# Patient Record
Sex: FEMALE | Race: BLACK OR AFRICAN AMERICAN | NOT HISPANIC OR LATINO | Employment: OTHER | URBAN - METROPOLITAN AREA
[De-identification: names, ages, dates, MRNs, and addresses within clinical notes are randomized per-mention and may not be internally consistent; named-entity substitution may affect disease eponyms.]

---

## 2017-05-05 ENCOUNTER — HOSPITAL ENCOUNTER (EMERGENCY)
Facility: HOSPITAL | Age: 82
Discharge: HOME | End: 2017-05-05
Attending: EMERGENCY MEDICINE
Payer: COMMERCIAL

## 2017-05-05 VITALS
HEART RATE: 68 BPM | TEMPERATURE: 98 F | DIASTOLIC BLOOD PRESSURE: 69 MMHG | BODY MASS INDEX: 25.61 KG/M2 | OXYGEN SATURATION: 96 % | SYSTOLIC BLOOD PRESSURE: 160 MMHG | RESPIRATION RATE: 18 BRPM | WEIGHT: 150 LBS | HEIGHT: 64 IN

## 2017-05-05 DIAGNOSIS — I10 ESSENTIAL HYPERTENSION: Primary | ICD-10-CM

## 2017-05-05 LAB
ALBUMIN SERPL BCP-MCNC: 4 G/DL
ALP SERPL-CCNC: 43 U/L
ALT SERPL W/O P-5'-P-CCNC: 14 U/L
ANION GAP SERPL CALC-SCNC: 13 MMOL/L
AST SERPL-CCNC: 17 U/L
BASOPHILS # BLD AUTO: 0.02 K/UL
BASOPHILS NFR BLD: 0.4 %
BILIRUB SERPL-MCNC: 0.9 MG/DL
BILIRUB UR QL STRIP: NEGATIVE
BUN SERPL-MCNC: 18 MG/DL
CALCIUM SERPL-MCNC: 10.6 MG/DL
CHLORIDE SERPL-SCNC: 106 MMOL/L
CLARITY UR: CLEAR
CO2 SERPL-SCNC: 23 MMOL/L
COLOR UR: YELLOW
CREAT SERPL-MCNC: 1.8 MG/DL
DIFFERENTIAL METHOD: NORMAL
EOSINOPHIL # BLD AUTO: 0.2 K/UL
EOSINOPHIL NFR BLD: 2.8 %
ERYTHROCYTE [DISTWIDTH] IN BLOOD BY AUTOMATED COUNT: 14.3 %
EST. GFR  (AFRICAN AMERICAN): 30 ML/MIN/1.73 M^2
EST. GFR  (NON AFRICAN AMERICAN): 26 ML/MIN/1.73 M^2
GLUCOSE SERPL-MCNC: 85 MG/DL
GLUCOSE UR QL STRIP: NEGATIVE
HCT VFR BLD AUTO: 41.5 %
HGB BLD-MCNC: 13.6 G/DL
HGB UR QL STRIP: NEGATIVE
KETONES UR QL STRIP: NEGATIVE
LEUKOCYTE ESTERASE UR QL STRIP: NEGATIVE
LYMPHOCYTES # BLD AUTO: 1.3 K/UL
LYMPHOCYTES NFR BLD: 24.1 %
MCH RBC QN AUTO: 29.1 PG
MCHC RBC AUTO-ENTMCNC: 32.8 %
MCV RBC AUTO: 89 FL
MONOCYTES # BLD AUTO: 0.5 K/UL
MONOCYTES NFR BLD: 8.5 %
NEUTROPHILS # BLD AUTO: 3.5 K/UL
NEUTROPHILS NFR BLD: 64.2 %
NITRITE UR QL STRIP: NEGATIVE
PH UR STRIP: 7 [PH] (ref 5–8)
PLATELET # BLD AUTO: 203 K/UL
PMV BLD AUTO: 9.7 FL
POTASSIUM SERPL-SCNC: 4.3 MMOL/L
PROT SERPL-MCNC: 8.1 G/DL
PROT UR QL STRIP: NEGATIVE
RBC # BLD AUTO: 4.68 M/UL
SODIUM SERPL-SCNC: 142 MMOL/L
SP GR UR STRIP: <=1.005 (ref 1–1.03)
URN SPEC COLLECT METH UR: ABNORMAL
UROBILINOGEN UR STRIP-ACNC: NEGATIVE EU/DL
WBC # BLD AUTO: 5.39 K/UL

## 2017-05-05 PROCEDURE — 85025 COMPLETE CBC W/AUTO DIFF WBC: CPT

## 2017-05-05 PROCEDURE — 81003 URINALYSIS AUTO W/O SCOPE: CPT

## 2017-05-05 PROCEDURE — 80053 COMPREHEN METABOLIC PANEL: CPT

## 2017-05-05 PROCEDURE — 99283 EMERGENCY DEPT VISIT LOW MDM: CPT

## 2017-05-05 RX ORDER — NAPROXEN SODIUM 220 MG/1
81 TABLET, FILM COATED ORAL DAILY
COMMUNITY

## 2017-05-05 RX ORDER — ATORVASTATIN CALCIUM 10 MG/1
10 TABLET, FILM COATED ORAL DAILY
COMMUNITY

## 2017-05-05 NOTE — ED AVS SNAPSHOT
[facility]  [address] [facility] [address]               Efe Melendez   2017  5:44 PM   ED    Description:  Female : 1933   Department:  [facility]            Your Care was Coordinated By:     Provider Role From To    Marcial Peter MD Attending Provider 17 0344 --      Reason for Visit     Hypertension     Dysuria           Diagnoses this Visit        Comments    Essential hypertension    -  Primary       ED Disposition     ED Disposition Condition Comment    Discharge             To Do List           Follow-up Information     Follow up with Galdino Smith MD In 3 days.    Specialty:  Family Medicine    Contact information:    [address]  [facility] [address]  [phone]        [facility]     [facility] -  Assistance  Unless otherwise directed by your provider, please contact [facility], our nurse care line that is available for  assistance.     Registered nurses in the [facility] provide: appointment scheduling, clinical advisement, health education, and other advisory services.  Call: [phone] (toll free)               Medications           Message regarding Medications     Verify the changes and/or additions to your medication regime listed below are the same as discussed with your clinician today.  If any of these changes or additions are incorrect, please notify your healthcare provider.             Verify that the below list of medications is an accurate representation of the medications you are currently taking.  If none reported, the list may be blank. If incorrect, please contact your healthcare provider. Carry this list with you in case of emergency.           Current Medications     aspirin 81 MG Chew Take 81 mg by mouth once daily.    atenolol (TENORMIN) 50 MG tablet Take 50 mg by mouth once daily.    atorvastatin (LIPITOR) 10 MG  "tablet Take 10 mg by mouth once daily.    levothyroxine (SYNTHROID) 50 MCG tablet Take 50 mcg by mouth once daily.    potassium chloride (KLOR-CON) 10 MEQ TbSR Take 20 mEq by mouth once daily.    triamterene-hydrochlorothiazide 37.5-25 mg (DYAZIDE) 37.5-25 mg per capsule Take 1 capsule by mouth every morning.    docusate sodium (COLACE) 100 MG capsule Take 1 capsule (100 mg total) by mouth 3 (three) times daily as needed.    hydrocodone-acetaminophen 10-325mg (NORCO)  mg Tab Take by mouth.    meloxicam (MOBIC) 15 MG tablet Take 15 mg by mouth once daily.    metformin (GLUCOPHAGE) 500 MG tablet Take 250 mg by mouth 2 (two) times daily with meals.           Clinical Reference Information           Your Vitals Were     BP Pulse Temp Resp Height Weight    158/71 (BP Location: Right arm, Patient Position: Lying, BP Method: Automatic) 61 98.3 °F (36.8 °C) (Oral) 17 5' 4" (1.626 m) 68 kg (150 lb)    SpO2 BMI             95% 25.75 kg/m2         Allergies as of 5/5/2017     No Known Allergies      Immunizations Administered on Date of Encounter - 5/5/2017     None      ED Micro, Lab, POCT     Start Ordered       Status Ordering Provider    05/05/17 1955 05/05/17 1954  Comprehensive metabolic panel  STAT      Final result     05/05/17 1817 05/05/17 1816  CBC auto differential  STAT      Final result     05/05/17 1817 05/05/17 1816    STAT,   Status:  Canceled      Canceled     05/05/17 1817 05/05/17 1816    STAT,   Status:  Canceled      Canceled     05/05/17 1742 05/05/17 1742  Urinalysis  STAT      Final result       ED Imaging Orders     None        Discharge Instructions         Eating Heart-Healthy Foods  Eating has a big impact on your heart health. In fact, eating healthier can improve several of your heart risks at once. For instance, it helps you manage weight, cholesterol, and blood pressure. Here are ideas to help you make heart-healthy changes without giving up all the foods and flavors you love.  Getting " started  · Talk with your health care provider about eating plans, such as the DASH or Mediterranean diet. You may also be referred to a dietitian.  · Change a few things at a time. Give yourself time to get used to a few eating changes before adding more.  · Work to create a tasty, healthy eating plan that you can stick to for the rest of your life.    Goals for healthy eating  Below are some tips to improve your eating habits:  · Limit saturated fats and trans fats. Saturated fats raise your levels of cholesterol, so keep these fats to a minimum. They are found in foods such as fatty meats, whole milk, cheese, and palm and coconut oils. Avoid trans fats because they lower good cholesterol as well as raise bad cholesterol. Trans fats are most often found in processed foods.  · Reduce sodium (salt) intake. Eating too much salt may increase your blood pressure. Limit your sodium intake to 2,300 milligrams (mg) per day, or less if your health care provider recommends it. Dining out less often and eating fewer processed foods are two great ways to decrease the amount of salt you consume.  · Managing calories. A calorie is a unit of energy. Your body burns calories for fuel, but if you eat more calories than your body burns, the extras are stored as fat. Your health care provider can help you create a diet plan to manage your calories. This will likely include eating healthier foods as well as exercising regularly. To help you track your progress, keep a diary to record what you eat and how often you exercise.  Choose the right foods  Aim to make these foods staples of your diet. If you have diabetes, you may have different recommendations than what is listed here:  · Fruits and vegetable provide plenty of nutrients without a lot of calories. At meals, fill half your plate with these foods. Split the other half of your plate between whole grains and lean protein.  · Whole grains are high in fiber and rich in vitamins  and nutrients. Good choices include whole-wheat bread, pasta, and brown rice.  · Lean proteins give you nutrition with less fat. Good choices include fish, skinless chicken, and beans.  · Low-fat or nonfat dairy provides nutrients without a lot of fat. Try low-fat or nonfat milk, cheese, or yogurt.  · Healthy fats can be good for you in small amounts. These are unsaturated fats, such as olive oil, nuts, and fish. Try to have at least 2 servings per week of fatty fish such as salmon, sardines, mackerel, rainbow trout, and albacore tuna. These contain omega-3 fatty acids, which are good for your heart. Flaxseed is another source of a heart-healthy fat.  More on heart healthy eating    Read food labels  Healthy eating starts at the grocery store. Be sure to pay attention to food labels on packaged foods. Look for products that are high in fiber and protein, and low in saturated fat, cholesterol, and sodium. Avoid products that contain trans fat. And pay close attention to serving size. For instance, if you plan to eat two servings, double all the numbers on the label.  Prepare food right  A key part of healthy cooking is cutting down on added fat and salt. Look on the internet for lower-fat, lower-sodium recipes. Also, try these tips:  · Remove fat from meat and skin from poultry before cooking.  · Skim fat from the surface of soups and sauces.  · Broil, boil, bake, steam, grill, and microwave food without added fats.  · Choose ingredients that spice up your food without adding calories, fat, or sodium. Try these items: horseradish, hot sauce, lemon, mustard, nonfat salad dressings, and vinegar. For salt-free herbs and spices, try basil, cilantro, cinnamon, pepper, and rosemary.  Date Last Reviewed: 6/25/2015  © 4380-5115 [employer]. [address] [place] [address], [place] [address]. All rights reserved. This information is not intended as a substitute for professional medical care. Always follow your healthcare  professional's instructions.          [facility] Sign-Up     Activating your [facility] account is as easy as 1-2-3!     1) Visit [email or web address], select Sign Up Now, enter this activation code and your date of birth, then select Next.  [email or web address]  Expires: 6/19/2017  8:58 PM      2) Create a username and password to use when you visit [facility] in the future and select a security question in case you lose your password and select Next.    3) Enter your e-mail address and click Sign Up!    Additional Information  If you have questions, please e-mail [email or web address] or call [phone] to talk to our [facility] staff. Remember, [facility] is NOT to be used for urgent needs. For medical emergencies, dial 911.         Smoking Cessation     If you would like to quit smoking:   You may be eligible for free services if you are a [place] resident and started smoking cigarettes before September 1, 1988.  Call the Smoking Cessation [employer] (SCT) toll free at (739) 985-9255 or (753) 566-9655.   Call 2-951-QUIT-NOW if you do not meet the above criteria.   Contact us via email: [email or web address]   View our website for more information: [email or web address]         [facility] complies with applicable Federal civil rights laws and does not discriminate on the basis of race, color, national origin, age, disability, or sex.        Language Assistance Services     ATTENTION: Language assistance services are available, free of charge. Please call [phone].      ATENCIÓN: Si habla español, tiene a may disposición servicios gratuitos de asistencia lingüística. Llame al [phone].     CHÚ Ý: N?u b?n nói Ti?ng Vi?t, có các d?ch v? h? tr? ngôn ng? mi?n phí dành cho b?n. G?i s? [phone].

## 2017-05-05 NOTE — ED PROVIDER NOTES
SCRIBE #1 NOTE: I, Alfredo Wallace, am scribing for, and in the presence of, Marcial Peter MD. I have scribed the entire note.      History      Chief Complaint   Patient presents with    Hypertension     pt reports her pressure was going up at home, denies headache    Dysuria     pt reports urinary frequcny       Review of patient's allergies indicates:  No Known Allergies     HPI   HPI    5/5/2017, 6:18 PM   History obtained from the patient      History of Present Illness: Efe Melendez is a 83 y.o. female patient who presents to the Emergency Department for increase in urine frequency which onset gradually 8 hours PTA. Symptoms are intermittent and moderate in severity. Pt reports her blood pressure was high at home. No mitigating or exacerbating factors reported. No associated sx reported. Patient denies any dysuria, hematuria, CP, SOB, n/v/d, HA, lightheadedness, dizziness, numbness, weakness, abdominal pain, and all other sxs at this time. No further complaints or concerns at this time.         Arrival mode: Personal vehicle     PCP: Galdino Smith MD       Past Medical History:  Past Medical History:   Diagnosis Date    Diabetes mellitus     Hypertension     Thyroid disease        Past Surgical History:  Past Surgical History:   Procedure Laterality Date    BREAST SURGERY      CARPAL TUNNEL RELEASE      HYSTERECTOMY           Family History:  History reviewed. No pertinent family history.    Social History:  Social History     Social History Main Topics    Smoking status: Former Smoker    Smokeless tobacco: Unknown    Alcohol use No    Drug use: No    Sexual activity: Unknown       ROS   Review of Systems   Constitutional: Negative for chills and fever.   HENT: Negative for sore throat.    Respiratory: Negative for cough and shortness of breath.    Cardiovascular: Negative for chest pain.   Gastrointestinal: Negative for abdominal pain, constipation, diarrhea, nausea and vomiting.  "  Genitourinary: Positive for frequency (increase). Negative for dysuria.   Musculoskeletal: Negative for back pain.   Skin: Negative for rash.   Neurological: Negative for dizziness, weakness, light-headedness, numbness and headaches.   Hematological: Does not bruise/bleed easily.       Physical Exam    Initial Vitals   BP Pulse Resp Temp SpO2   05/05/17 1739 05/05/17 1739 05/05/17 1739 05/05/17 1739 --   189/100 81 18 98.3 °F (36.8 °C)       Physical Exam  Nursing Notes and Vital Signs Reviewed.  Constitutional: Patient is in no acute distress. Awake and alert. Elderly.  Head: Atraumatic. Normocephalic.  Eyes: PERRL. EOM intact. Conjunctivae are not pale. No scleral icterus.  ENT: Mucous membranes are moist. Oropharynx is clear and symmetric.    Neck: Supple. Full ROM. No lymphadenopathy.  Cardiovascular: Regular rate. Regular rhythm. No murmurs, rubs, or gallops. Distal pulses are 2+ and symmetric.  Pulmonary/Chest: No respiratory distress. Clear to auscultation bilaterally. No wheezing, rales, or rhonchi.  Abdominal: Soft and non-distended.  There is no tenderness.  No rebound, guarding, or rigidity.  Musculoskeletal: Moves all extremities. No obvious deformities. No edema.   Skin: Warm and dry.  Neurological:  Alert, awake, and appropriate.  Normal speech.  No acute focal neurological deficits are appreciated.  Psychiatric: Normal affect. Good eye contact. Appropriate in content.    ED Course    Procedures  ED Vital Signs:  Vitals:    05/05/17 1739 05/05/17 1823 05/05/17 2020 05/05/17 2100   BP: (!) 189/100 (!) 173/80 (!) 158/71 (!) 160/69   Pulse: 81 71 61 68   Resp: 18 17 17 18   Temp: 98.3 °F (36.8 °C)      TempSrc: Oral      SpO2:  97% 95% 96%   Weight: 68 kg (150 lb)      Height: 5' 4" (1.626 m)          Abnormal Lab Results:  Labs Reviewed   URINALYSIS - Abnormal; Notable for the following:        Result Value    Specific Gravity, UA <=1.005 (*)     All other components within normal limits "   COMPREHENSIVE METABOLIC PANEL - Abnormal; Notable for the following:     Creatinine 1.8 (*)     Calcium 10.6 (*)     Alkaline Phosphatase 43 (*)     eGFR if  30 (*)     eGFR if non  26 (*)     All other components within normal limits   CBC W/ AUTO DIFFERENTIAL        All Lab Results:  Results for orders placed or performed during the hospital encounter of 05/05/17   Urinalysis   Result Value Ref Range    Specimen UA Urine, Clean Catch     Color, UA Yellow Yellow, Straw, Jacki    Appearance, UA Clear Clear    pH, UA 7.0 5.0 - 8.0    Specific Gravity, UA <=1.005 (A) 1.005 - 1.030    Protein, UA Negative Negative    Glucose, UA Negative Negative    Ketones, UA Negative Negative    Bilirubin (UA) Negative Negative    Occult Blood UA Negative Negative    Nitrite, UA Negative Negative    Urobilinogen, UA Negative <2.0 EU/dL    Leukocytes, UA Negative Negative   CBC auto differential   Result Value Ref Range    WBC 5.39 3.90 - 12.70 K/uL    RBC 4.68 4.00 - 5.40 M/uL    Hemoglobin 13.6 12.0 - 16.0 g/dL    Hematocrit 41.5 37.0 - 48.5 %    MCV 89 82 - 98 fL    MCH 29.1 27.0 - 31.0 pg    MCHC 32.8 32.0 - 36.0 %    RDW 14.3 11.5 - 14.5 %    Platelets 203 150 - 350 K/uL    MPV 9.7 9.2 - 12.9 fL    Gran # 3.5 1.8 - 7.7 K/uL    Lymph # 1.3 1.0 - 4.8 K/uL    Mono # 0.5 0.3 - 1.0 K/uL    Eos # 0.2 0.0 - 0.5 K/uL    Baso # 0.02 0.00 - 0.20 K/uL    Gran% 64.2 38.0 - 73.0 %    Lymph% 24.1 18.0 - 48.0 %    Mono% 8.5 4.0 - 15.0 %    Eosinophil% 2.8 0.0 - 8.0 %    Basophil% 0.4 0.0 - 1.9 %    Differential Method Automated    Comprehensive metabolic panel   Result Value Ref Range    Sodium 142 136 - 145 mmol/L    Potassium 4.3 3.5 - 5.1 mmol/L    Chloride 106 95 - 110 mmol/L    CO2 23 23 - 29 mmol/L    Glucose 85 70 - 110 mg/dL    BUN, Bld 18 8 - 23 mg/dL    Creatinine 1.8 (H) 0.5 - 1.4 mg/dL    Calcium 10.6 (H) 8.7 - 10.5 mg/dL    Total Protein 8.1 6.0 - 8.4 g/dL    Albumin 4.0 3.5 - 5.2 g/dL    Total  Bilirubin 0.9 0.1 - 1.0 mg/dL    Alkaline Phosphatase 43 (L) 55 - 135 U/L    AST 17 10 - 40 U/L    ALT 14 10 - 44 U/L    Anion Gap 13 8 - 16 mmol/L    eGFR if African American 30 (A) >60 mL/min/1.73 m^2    eGFR if non African American 26 (A) >60 mL/min/1.73 m^2                The Emergency Provider reviewed the vital signs and test results, which are outlined above.    ED Discussion     8:51 PM: Reassessed pt at this time. Pt is awake, alert, and in no distress.  Pt states her condition has improved at this time. Discussed with pt all pertinent ED information and results. Discussed pt dx and plan of tx. Gave pt all f/u and return to the ED instructions. All questions and concerns were addressed at this time. Pt expresses understanding of information and instructions, and is comfortable with plan to discharge. Pt is stable for discharge.    Pre-hypertension/Hypertension: The pt has been informed that they may have pre-hypertension or hypertension based on a blood pressure reading in the ED. I recommend that the pt call the PCP listed on their discharge instructions or a physician of their choice this week to arrange f/u for further evaluation of possible pre-hypertension or hypertension.       ED Medication(s):  Medications - No data to display    Discharge Medication List as of 5/5/2017  8:58 PM          Follow-up Information     Follow up with Galdino Smith MD In 3 days.    Specialty:  Family Medicine    Contact information:    [address] [facility] [id number] [phone]              Medical Decision Making    Medical Decision Making:   Clinical Tests:   Lab Tests: Ordered and Reviewed           Scribe Attestation:   Scribe #1: I performed the above scribed service and the documentation accurately describes the services I performed. I attest to the accuracy of the note.    Attending:   Physician Attestation Statement for Scribe #1: I, Marcial Peter MD, personally performed the services  described in this documentation, as scribed by Alfredo Wallace, in my presence, and it is both accurate and complete.          Clinical Impression       ICD-10-CM ICD-9-CM   1. Essential hypertension I10 401.9       Disposition:   Disposition: Discharged  Condition: Stable         Marcial Peter MD  05/05/17 [id number]

## 2017-05-06 NOTE — ED NOTES
Received report from April, RN Pt. In NAD, VSS, RR equal and unlabored. Pt awaiting results and disposition. Pt's bed is low, locked, and call light in reach. SR up X2. Will continue to monitor pt.

## 2017-05-06 NOTE — DISCHARGE INSTRUCTIONS

## 2017-06-03 ENCOUNTER — HOSPITAL ENCOUNTER (EMERGENCY)
Facility: HOSPITAL | Age: 82
Discharge: HOME | End: 2017-06-03
Attending: EMERGENCY MEDICINE
Payer: COMMERCIAL

## 2017-06-03 VITALS
RESPIRATION RATE: 13 BRPM | DIASTOLIC BLOOD PRESSURE: 60 MMHG | BODY MASS INDEX: 28.17 KG/M2 | SYSTOLIC BLOOD PRESSURE: 141 MMHG | TEMPERATURE: 99 F | OXYGEN SATURATION: 99 % | HEIGHT: 64 IN | HEART RATE: 73 BPM | WEIGHT: 165 LBS

## 2017-06-03 DIAGNOSIS — N28.9 RENAL INSUFFICIENCY: ICD-10-CM

## 2017-06-03 DIAGNOSIS — I95.1 ORTHOSTATIC HYPOTENSION: Primary | ICD-10-CM

## 2017-06-03 DIAGNOSIS — R42 DIZZINESS: ICD-10-CM

## 2017-06-03 DIAGNOSIS — I10 ESSENTIAL HYPERTENSION: ICD-10-CM

## 2017-06-03 LAB
ALBUMIN SERPL BCP-MCNC: 3.6 G/DL
ALP SERPL-CCNC: 38 U/L
ALT SERPL W/O P-5'-P-CCNC: 17 U/L
ANION GAP SERPL CALC-SCNC: 13 MMOL/L
APTT BLDCRRT: 25.9 SEC
AST SERPL-CCNC: 15 U/L
BACTERIA #/AREA URNS HPF: ABNORMAL /HPF
BASOPHILS # BLD AUTO: 0.02 K/UL
BASOPHILS NFR BLD: 0.4 %
BILIRUB SERPL-MCNC: 0.9 MG/DL
BILIRUB UR QL STRIP: NEGATIVE
BUN SERPL-MCNC: 18 MG/DL
CALCIUM SERPL-MCNC: 10 MG/DL
CHLORIDE SERPL-SCNC: 107 MMOL/L
CLARITY UR: CLEAR
CO2 SERPL-SCNC: 22 MMOL/L
COLOR UR: YELLOW
CREAT SERPL-MCNC: 1.8 MG/DL
DIFFERENTIAL METHOD: NORMAL
EOSINOPHIL # BLD AUTO: 0.1 K/UL
EOSINOPHIL NFR BLD: 2.4 %
ERYTHROCYTE [DISTWIDTH] IN BLOOD BY AUTOMATED COUNT: 14 %
EST. GFR  (AFRICAN AMERICAN): 30 ML/MIN/1.73 M^2
EST. GFR  (NON AFRICAN AMERICAN): 26 ML/MIN/1.73 M^2
GLUCOSE SERPL-MCNC: 100 MG/DL
GLUCOSE UR QL STRIP: NEGATIVE
HCT VFR BLD AUTO: 38.7 %
HGB BLD-MCNC: 12.8 G/DL
HGB UR QL STRIP: ABNORMAL
INR PPP: 1
KETONES UR QL STRIP: NEGATIVE
LEUKOCYTE ESTERASE UR QL STRIP: ABNORMAL
LYMPHOCYTES # BLD AUTO: 1.2 K/UL
LYMPHOCYTES NFR BLD: 26.1 %
MCH RBC QN AUTO: 29 PG
MCHC RBC AUTO-ENTMCNC: 33.1 %
MCV RBC AUTO: 88 FL
MICROSCOPIC COMMENT: ABNORMAL
MONOCYTES # BLD AUTO: 0.5 K/UL
MONOCYTES NFR BLD: 10.3 %
NEUTROPHILS # BLD AUTO: 2.9 K/UL
NEUTROPHILS NFR BLD: 60.8 %
NITRITE UR QL STRIP: NEGATIVE
PH UR STRIP: 6 [PH] (ref 5–8)
PLATELET # BLD AUTO: 204 K/UL
PMV BLD AUTO: 9.2 FL
POTASSIUM SERPL-SCNC: 3.8 MMOL/L
PROT SERPL-MCNC: 7.3 G/DL
PROT UR QL STRIP: NEGATIVE
PROTHROMBIN TIME: 10.7 SEC
RBC # BLD AUTO: 4.41 M/UL
RBC #/AREA URNS HPF: 1 /HPF (ref 0–4)
SODIUM SERPL-SCNC: 142 MMOL/L
SP GR UR STRIP: 1.02 (ref 1–1.03)
SQUAMOUS #/AREA URNS HPF: 50 /HPF
TROPONIN I SERPL DL<=0.01 NG/ML-MCNC: 0.02 NG/ML
URN SPEC COLLECT METH UR: ABNORMAL
UROBILINOGEN UR STRIP-ACNC: NEGATIVE EU/DL
WBC # BLD AUTO: 4.68 K/UL
WBC #/AREA URNS HPF: 10 /HPF (ref 0–5)

## 2017-06-03 PROCEDURE — 84484 ASSAY OF TROPONIN QUANT: CPT

## 2017-06-03 PROCEDURE — 99284 EMERGENCY DEPT VISIT MOD MDM: CPT | Mod: 25

## 2017-06-03 PROCEDURE — 85025 COMPLETE CBC W/AUTO DIFF WBC: CPT

## 2017-06-03 PROCEDURE — 85610 PROTHROMBIN TIME: CPT

## 2017-06-03 PROCEDURE — 93010 ELECTROCARDIOGRAM REPORT: CPT | Mod: S$GLB,,, | Performed by: INTERNAL MEDICINE

## 2017-06-03 PROCEDURE — 96360 HYDRATION IV INFUSION INIT: CPT

## 2017-06-03 PROCEDURE — 80053 COMPREHEN METABOLIC PANEL: CPT

## 2017-06-03 PROCEDURE — 25000003 PHARM REV CODE 250: Performed by: EMERGENCY MEDICINE

## 2017-06-03 PROCEDURE — 85730 THROMBOPLASTIN TIME PARTIAL: CPT

## 2017-06-03 PROCEDURE — 93005 ELECTROCARDIOGRAM TRACING: CPT

## 2017-06-03 PROCEDURE — 81000 URINALYSIS NONAUTO W/SCOPE: CPT

## 2017-06-03 RX ORDER — FAMOTIDINE 10 MG/1
10 TABLET ORAL 2 TIMES DAILY
COMMUNITY

## 2017-06-03 RX ORDER — DOXAZOSIN 1 MG/1
1 TABLET ORAL NIGHTLY
COMMUNITY

## 2017-06-03 RX ADMIN — SODIUM CHLORIDE 500 ML: 0.9 INJECTION, SOLUTION INTRAVENOUS at 11:06

## 2017-06-03 NOTE — ED PROVIDER NOTES
SCRIBE #1 NOTE: I, Caren Parks, am scribing for, and in the presence of, Sommer Matson DO. I have scribed the entire note.      History      Chief Complaint   Patient presents with    Dizziness     pt states she feels dizzy and weak since last night       Review of patient's allergies indicates:  No Known Allergies     HPI   HPI    6/3/2017, 9:59 AM   History obtained from the patient      History of Present Illness: Efe Melendez is a 83 y.o. female patient who presents to the Emergency Department for generalized weakness which onset gradually over the past few months. Symptoms are constant and moderate in severity.  No mitigating or exacerbating factors reported. Associated sxs include dizziness, malaise, and increased urination during the night. Patient denies any rashes, sores, localized numbness/weakness, CP, SOB, abd pain, LOC, and all other sxs at this time. No prior tx reported. No further complaints or concerns at this time.         Arrival mode: Personal vehicle     PCP: Galdino Smith MD       Past Medical History:  Past Medical History:   Diagnosis Date    Diabetes mellitus     Hypertension     Thyroid disease        Past Surgical History:  Past Surgical History:   Procedure Laterality Date    BREAST SURGERY      CARPAL TUNNEL RELEASE      HYSTERECTOMY           Family History:  No family history on file.    Social History:  Social History     Social History Main Topics    Smoking status: Former Smoker    Smokeless tobacco: Not on file    Alcohol use No    Drug use: No    Sexual activity: Not on file       ROS   Review of Systems   Constitutional: Negative for fever.        (+) Malaise   HENT: Negative for sore throat.    Respiratory: Negative for shortness of breath.    Cardiovascular: Negative for chest pain.   Gastrointestinal: Negative for nausea.   Genitourinary: Negative for dysuria.        (+) urine increased   Musculoskeletal: Negative for back pain.        (-)  "generalized weakness   Skin: Negative for rash.        (-) sores     Neurological: Positive for dizziness. Negative for weakness, light-headedness and headaches.   Hematological: Does not bruise/bleed easily.     Physical Exam      Initial Vitals [06/03/17 0929]   BP Pulse Resp Temp SpO2   (!) 163/77 95 16 98.9 °F (37.2 °C) 97 %      Physical Exam  Nursing Notes and Vital Signs Reviewed.  Constitutional: Patient is in no acute distress. Well-developed and well-nourished.  Head: Atraumatic. Normocephalic.  Eyes: PERRL. EOM intact. Conjunctivae are not pale. No scleral icterus.  ENT: Mucous membranes are moist. Oropharynx is clear and symmetric.    Neck: Supple. Full ROM. No lymphadenopathy.  Cardiovascular: Regular rate. Regular rhythm. No murmurs, rubs, or gallops. Distal pulses are 2+ and symmetric.  Pulmonary/Chest: No respiratory distress. Clear to auscultation bilaterally. No wheezing, rales, or rhonchi.  Abdominal: Soft and non-distended.  There is no tenderness.  No rebound, guarding, or rigidity. Good bowel sounds.  Musculoskeletal: Moves all extremities. No obvious deformities. No edema. No calf tenderness.  Skin: Warm and dry.  Neurological: Patient is alert and oriented to person, place and time. Pupils ERRL and EOM normal. Cranial nerves II-XII are intact. Strength is full bilaterally; it is equal and 5/5 in bilateral upper and lower extremities. There is no pronator drift of outstretched arms. Light touch sense is intact. Speech is clear and normal. No acute focal neurological deficits noted. NIH stroke scale is zero.  Psychiatric: Normal affect. Good eye contact. Appropriate in content.    ED Course    Procedures  ED Vital Signs:  Vitals:    06/03/17 0929 06/03/17 0936 06/03/17 1009 06/03/17 1011   BP: (!) 163/77 (!) 163/77 132/62 (!) 143/79   Pulse: 95 93 78 80   Resp: 16 14     Temp: 98.9 °F (37.2 °C)      TempSrc: Oral      SpO2: 97% 98% 99% 99%   Weight: 74.8 kg (165 lb)      Height: 5' 4" (1.626 m) "       06/03/17 1013 06/03/17 1050   BP: 139/73 (!) 141/60   Pulse: 102 73   Resp:  13   Temp:     TempSrc:     SpO2: 96% 99%   Weight:     Height:         Abnormal Lab Results:  Labs Reviewed   COMPREHENSIVE METABOLIC PANEL - Abnormal; Notable for the following:        Result Value    CO2 22 (*)     Creatinine 1.8 (*)     Alkaline Phosphatase 38 (*)     eGFR if  30 (*)     eGFR if non  26 (*)     All other components within normal limits   URINALYSIS - Abnormal; Notable for the following:     Occult Blood UA Trace (*)     Leukocytes, UA 1+ (*)     All other components within normal limits   URINALYSIS MICROSCOPIC - Abnormal; Notable for the following:     WBC, UA 10 (*)     Bacteria, UA Few (*)     All other components within normal limits   CBC W/ AUTO DIFFERENTIAL   TROPONIN I   PROTIME-INR   APTT        All Lab Results:  Results for orders placed or performed during the hospital encounter of 06/03/17   CBC auto differential   Result Value Ref Range    WBC 4.68 3.90 - 12.70 K/uL    RBC 4.41 4.00 - 5.40 M/uL    Hemoglobin 12.8 12.0 - 16.0 g/dL    Hematocrit 38.7 37.0 - 48.5 %    MCV 88 82 - 98 fL    MCH 29.0 27.0 - 31.0 pg    MCHC 33.1 32.0 - 36.0 %    RDW 14.0 11.5 - 14.5 %    Platelets 204 150 - 350 K/uL    MPV 9.2 9.2 - 12.9 fL    Gran # 2.9 1.8 - 7.7 K/uL    Lymph # 1.2 1.0 - 4.8 K/uL    Mono # 0.5 0.3 - 1.0 K/uL    Eos # 0.1 0.0 - 0.5 K/uL    Baso # 0.02 0.00 - 0.20 K/uL    Gran% 60.8 38.0 - 73.0 %    Lymph% 26.1 18.0 - 48.0 %    Mono% 10.3 4.0 - 15.0 %    Eosinophil% 2.4 0.0 - 8.0 %    Basophil% 0.4 0.0 - 1.9 %    Differential Method Automated    Comprehensive metabolic panel   Result Value Ref Range    Sodium 142 136 - 145 mmol/L    Potassium 3.8 3.5 - 5.1 mmol/L    Chloride 107 95 - 110 mmol/L    CO2 22 (L) 23 - 29 mmol/L    Glucose 100 70 - 110 mg/dL    BUN, Bld 18 8 - 23 mg/dL    Creatinine 1.8 (H) 0.5 - 1.4 mg/dL    Calcium 10.0 8.7 - 10.5 mg/dL    Total Protein 7.3 6.0 -  8.4 g/dL    Albumin 3.6 3.5 - 5.2 g/dL    Total Bilirubin 0.9 0.1 - 1.0 mg/dL    Alkaline Phosphatase 38 (L) 55 - 135 U/L    AST 15 10 - 40 U/L    ALT 17 10 - 44 U/L    Anion Gap 13 8 - 16 mmol/L    eGFR if African American 30 (A) >60 mL/min/1.73 m^2    eGFR if non African American 26 (A) >60 mL/min/1.73 m^2   Troponin I   Result Value Ref Range    Troponin I 0.018 0.000 - 0.026 ng/mL   Protime-INR   Result Value Ref Range    Prothrombin Time 10.7 9.0 - 12.5 sec    INR 1.0 0.8 - 1.2   APTT   Result Value Ref Range    aPTT 25.9 21.0 - 32.0 sec   Urinalysis   Result Value Ref Range    Specimen UA Urine, Clean Catch     Color, UA Yellow Yellow, Straw, Jacki    Appearance, UA Clear Clear    pH, UA 6.0 5.0 - 8.0    Specific Gravity, UA 1.020 1.005 - 1.030    Protein, UA Negative Negative    Glucose, UA Negative Negative    Ketones, UA Negative Negative    Bilirubin (UA) Negative Negative    Occult Blood UA Trace (A) Negative    Nitrite, UA Negative Negative    Urobilinogen, UA Negative <2.0 EU/dL    Leukocytes, UA 1+ (A) Negative   Urinalysis Microscopic   Result Value Ref Range    RBC, UA 1 0 - 4 /hpf    WBC, UA 10 (H) 0 - 5 /hpf    Bacteria, UA Few (A) None-Occ /hpf    Squam Epithel, UA 50 /hpf    Microscopic Comment SEE COMMENT        Results for ZACK PETERSON (MRN [id number]) as of 6/3/2017 12:05   Ref. Range 5/5/2017 19:54 6/3/2017 10:31   BUN, Bld Latest Ref Range: 8 - 23 mg/dL 18 18   Creatinine Latest Ref Range: 0.5 - 1.4 mg/dL 1.8 (H) 1.8 (H)     Imaging Results:  Imaging Results          X-Ray Chest AP Portable (Final result)  Result time 06/03/17 11:41:55    Final result by Yoan Lawson MD (06/03/17 11:41:55)                 Impression:     No acute cardiopulmonary disease.            Electronically signed by: YOAN LAWSON MD  Date:     06/03/17  Time:    11:41              Narrative:    Exam: Portable chest radiograph    Clinical History:   .  Dizziness    Comparison: None.    Findings:.     No  consolidation or pleural effusion or pneumothorax.  There is some mild scarring in the left upper lobe and right lung base. The cardiac silhouette is within normal limits. The bones are intact.                             CT Head Without Contrast (Final result)  Result time 06/03/17 11:07:46    Final result by Yoan Lawson MD (06/03/17 11:07:46)                 Impression:         No CT evidence of acute intracranial abnormality.        All CT scans at this facility use dose modulation, iterative reconstruction and/or weight based dosing when appropriate to reduce radiation dose to as low as reasonably achievable.      Electronically signed by: YOAN LAWSON MD  Date:     06/03/17  Time:    11:07              Narrative:    Exam: CT scan of the head without contrast    Clinical History:  . Dizziness and weakness which began last night.     Technique: Axial CT imaging was performed through the head without intravenous contrast.     Findings:    No hydrocephalus, midline shift, mass effect, or acute intracranial hemorrhage. There is moderate chronic small vessel ischemic change of the white matter. There are age-related involutional changes of the brain. The visualized paranasal sinuses and mastoid air cells are clear. The skull is intact.                             The EKG was ordered, reviewed, and independently interpreted by the ED provider.  Interpretation time: 9:32  Rate: 94 BPM  Rhythm: normal sinus rhythm  Interpretation:Right superior axis deviation. Possible anterior infarct, age undetermined.. No STEMI.             The Emergency Provider reviewed the vital signs and test results, which are outlined above.    ED Discussion     12:18 PM: Re-evaluated pt. Pt is resting comfortably and is in no acute distress.  D/w pt all pertinent results. Informed pt her urine was contaminated. Pt did not want to do cath specimen. Talked with pt about dehydration. D/w pt any concerns expressed at this time. Answered all  questions. Pt expresses understanding at this time.      12:32 PM: Reassessed pt at this time.  Pt is awake, alert, and in no apparent distress. Discussed with pt all pertinent ED information and results. Discussed pt dx and plan of tx. Gave pt all f/u and return to the ED instructions. All questions and concerns were addressed at this time. Pt expresses understanding of information and instructions, and is comfortable with plan to discharge. Pt is stable for discharge.      I discussed with patient and/or family/caretaker that evaluation in the ED does not suggest any emergent or life threatening medical conditions requiring immediate intervention beyond what was provided in the ED, and I believe patient is safe for discharge.  Regardless, an unremarkable evaluation in the ED does not preclude the development or presence of a serious of life threatening condition. As such, patient was instructed to return immediately for any worsening or change in current symptoms.        ED Medication(s):  Medications   sodium chloride 0.9% bolus 500 mL (500 mLs Intravenous New Bag 6/3/17 0217)       New Prescriptions    No medications on file       Follow-up Information     Galdino Smith MD. Schedule an appointment as soon as possible for a visit in 2 days.    Specialty:  Family Medicine  Why:  Return to the ED for:  dizziness, numbness/weakness to one side, slurred speech, fever, abdominal pain or other concerns.  Contact information:  [address] [facility] [address] [phone]                     Medical Decision Making    Medical Decision Making:   Clinical Tests:   Lab Tests: Ordered and Reviewed  Radiological Study: Ordered and Reviewed  Medical Tests: Ordered and Reviewed           Scribe Attestation:   Scribe #1: I performed the above scribed service and the documentation accurately describes the services I performed. I attest to the accuracy of the note.    Attending:   Physician Attestation  Statement for Scribe #1: I, Sommer Matson DO, personally performed the services described in this documentation, as scribed by Caren Parks, in my presence, and it is both accurate and complete.          Clinical Impression       ICD-10-CM ICD-9-CM   1. Orthostatic hypotension I95.1 458.0   2. Dizziness R42 780.4   3. Essential hypertension I10 401.9   4. Renal insufficiency N28.9 593.9       Disposition:   Disposition: Discharged  Condition: Stable         Sommer Matson DO  06/04/17 1357

## 2017-06-03 NOTE — ED NOTES
Pt reports she started feeling weak and dizzy last night. Pt was awake, lying down when she felt bad. Reports she has had same complaint since her last visit to ER in the beginning of May 2017. Pt has had no improvement since that visit. Went to Dr. Smith PCP and Dr. Benson, cardiology. Pt was prescribed Cardura and told to come back in 1 month. Pt was having trouble with hypertension.

## 2017-12-26 ENCOUNTER — HOSPITAL ENCOUNTER (EMERGENCY)
Facility: HOSPITAL | Age: 82
Discharge: HOME | End: 2017-12-26
Attending: EMERGENCY MEDICINE
Payer: COMMERCIAL

## 2017-12-26 VITALS
TEMPERATURE: 98 F | OXYGEN SATURATION: 96 % | HEIGHT: 64 IN | HEART RATE: 74 BPM | RESPIRATION RATE: 18 BRPM | DIASTOLIC BLOOD PRESSURE: 78 MMHG | BODY MASS INDEX: 27.31 KG/M2 | WEIGHT: 160 LBS | SYSTOLIC BLOOD PRESSURE: 174 MMHG

## 2017-12-26 DIAGNOSIS — I10 ESSENTIAL HYPERTENSION: ICD-10-CM

## 2017-12-26 DIAGNOSIS — R42 DIZZINESS: ICD-10-CM

## 2017-12-26 DIAGNOSIS — N28.9 RENAL INSUFFICIENCY: Primary | ICD-10-CM

## 2017-12-26 LAB
ALBUMIN SERPL BCP-MCNC: 3.4 G/DL
ALP SERPL-CCNC: 39 U/L
ALT SERPL W/O P-5'-P-CCNC: 11 U/L
ANION GAP SERPL CALC-SCNC: 10 MMOL/L
AST SERPL-CCNC: 15 U/L
BASOPHILS # BLD AUTO: 0.01 K/UL
BASOPHILS NFR BLD: 0.2 %
BILIRUB SERPL-MCNC: 0.9 MG/DL
BILIRUB UR QL STRIP: NEGATIVE
BUN SERPL-MCNC: 14 MG/DL
CALCIUM SERPL-MCNC: 9.6 MG/DL
CHLORIDE SERPL-SCNC: 109 MMOL/L
CLARITY UR: CLEAR
CO2 SERPL-SCNC: 22 MMOL/L
COLOR UR: YELLOW
CREAT SERPL-MCNC: 1.6 MG/DL
DIFFERENTIAL METHOD: NORMAL
EOSINOPHIL # BLD AUTO: 0.1 K/UL
EOSINOPHIL NFR BLD: 1.1 %
ERYTHROCYTE [DISTWIDTH] IN BLOOD BY AUTOMATED COUNT: 13.8 %
EST. GFR  (AFRICAN AMERICAN): 34 ML/MIN/1.73 M^2
EST. GFR  (NON AFRICAN AMERICAN): 29 ML/MIN/1.73 M^2
GLUCOSE SERPL-MCNC: 106 MG/DL
GLUCOSE UR QL STRIP: NEGATIVE
HCT VFR BLD AUTO: 39.7 %
HGB BLD-MCNC: 13.3 G/DL
HGB UR QL STRIP: NEGATIVE
KETONES UR QL STRIP: NEGATIVE
LEUKOCYTE ESTERASE UR QL STRIP: NEGATIVE
LYMPHOCYTES # BLD AUTO: 1.3 K/UL
LYMPHOCYTES NFR BLD: 24.9 %
MAGNESIUM SERPL-MCNC: 1.8 MG/DL
MCH RBC QN AUTO: 29.7 PG
MCHC RBC AUTO-ENTMCNC: 33.5 G/DL
MCV RBC AUTO: 89 FL
MONOCYTES # BLD AUTO: 0.3 K/UL
MONOCYTES NFR BLD: 6.2 %
NEUTROPHILS # BLD AUTO: 3.6 K/UL
NEUTROPHILS NFR BLD: 67.6 %
NITRITE UR QL STRIP: NEGATIVE
PH UR STRIP: 6 [PH] (ref 5–8)
PLATELET # BLD AUTO: 199 K/UL
PMV BLD AUTO: 9.4 FL
POTASSIUM SERPL-SCNC: 3.8 MMOL/L
PROT SERPL-MCNC: 7.2 G/DL
PROT UR QL STRIP: NEGATIVE
RBC # BLD AUTO: 4.48 M/UL
SODIUM SERPL-SCNC: 141 MMOL/L
SP GR UR STRIP: 1.02 (ref 1–1.03)
TROPONIN I SERPL DL<=0.01 NG/ML-MCNC: <0.006 NG/ML
URN SPEC COLLECT METH UR: NORMAL
UROBILINOGEN UR STRIP-ACNC: NEGATIVE EU/DL
WBC # BLD AUTO: 5.34 K/UL

## 2017-12-26 PROCEDURE — 85025 COMPLETE CBC W/AUTO DIFF WBC: CPT

## 2017-12-26 PROCEDURE — 84484 ASSAY OF TROPONIN QUANT: CPT

## 2017-12-26 PROCEDURE — 93005 ELECTROCARDIOGRAM TRACING: CPT

## 2017-12-26 PROCEDURE — 99284 EMERGENCY DEPT VISIT MOD MDM: CPT | Mod: 25

## 2017-12-26 PROCEDURE — 93010 ELECTROCARDIOGRAM REPORT: CPT | Mod: ,,, | Performed by: INTERNAL MEDICINE

## 2017-12-26 PROCEDURE — 96360 HYDRATION IV INFUSION INIT: CPT

## 2017-12-26 PROCEDURE — 81003 URINALYSIS AUTO W/O SCOPE: CPT

## 2017-12-26 PROCEDURE — 83735 ASSAY OF MAGNESIUM: CPT

## 2017-12-26 PROCEDURE — 80053 COMPREHEN METABOLIC PANEL: CPT

## 2017-12-26 PROCEDURE — 25000003 PHARM REV CODE 250: Performed by: EMERGENCY MEDICINE

## 2017-12-26 RX ORDER — MECLIZINE HYDROCHLORIDE 25 MG/1
25 TABLET ORAL 3 TIMES DAILY PRN
Qty: 21 TABLET | Refills: 0 | Status: SHIPPED | OUTPATIENT
Start: 2017-12-26

## 2017-12-26 RX ORDER — MECLIZINE HYDROCHLORIDE 25 MG/1
25 TABLET ORAL
Status: COMPLETED | OUTPATIENT
Start: 2017-12-26 | End: 2017-12-26

## 2017-12-26 RX ADMIN — SODIUM CHLORIDE 500 ML: 900 INJECTION, SOLUTION INTRAVENOUS at 12:12

## 2017-12-26 RX ADMIN — MECLIZINE HYDROCHLORIDE 25 MG: 25 TABLET ORAL at 03:12

## 2017-12-26 NOTE — ED PROVIDER NOTES
"SCRIBE #1 NOTE: I, Dereje aLn, am scribing for, and in the presence of, Sommer Matson, DO. I have scribed the entire note.      History      Chief Complaint   Patient presents with    Dizziness     Pt states, "I feel weak and dizzy and when I turn my head I feel like I'm going to pass out."       Review of patient's allergies indicates:   Allergen Reactions    Meloxicam Other (See Comments)        HPI   HPI    12/26/2017, 12:35 PM   History obtained from the patient      History of Present Illness: Efe Melendez is a 84 y.o. female patient who presents to the Emergency Department for an evaluation of dizziness which onset suddenly last night. Pt reports when she lays down and turns her head to the right, she feels like the room is spinning. Symptoms are constant and moderate in severity. Exacerbated by lying her head down and relieved by nothing. Associated sxs include diarrhea a few days ago. Patient denies any fever, chills, weakness/numbness, visual disturbance/photophobia, numbness/weakness to one side,  CP, abd pain, N/V, urinary frequency/urgency, dysuria, hematuria, and all other sxs at this time. No further complaints or concerns at this time.     Arrival mode: Personal vehicle    PCP: Galdino Smith MD       Past Medical History:  Past Medical History:   Diagnosis Date    Diabetes mellitus     Hypertension     Thyroid disease        Past Surgical History:  Past Surgical History:   Procedure Laterality Date    BREAST SURGERY      CARPAL TUNNEL RELEASE      HYSTERECTOMY           Family History:  Family history reviewed not relevant      Social History:  Social History     Social History Main Topics    Smoking status: Former Smoker    Smokeless tobacco: Unknown    Alcohol use No    Drug use: No    Sexual activity: Unknown       ROS   Review of Systems   Constitutional: Negative for chills and fever.   HENT: Negative for congestion, trouble swallowing and voice change.    Eyes: " Negative for photophobia and visual disturbance.   Respiratory: Negative for cough and shortness of breath.    Cardiovascular: Negative for chest pain.   Gastrointestinal: Positive for diarrhea. Negative for abdominal pain, nausea and vomiting.   Genitourinary: Negative for dysuria and hematuria.   Musculoskeletal: Negative for back pain, neck pain and neck stiffness.   Skin: Negative for rash.   Neurological: Positive for dizziness. Negative for facial asymmetry, speech difficulty, weakness, light-headedness, numbness and headaches.   Psychiatric/Behavioral: Negative for confusion.     Physical Exam      Initial Vitals [12/26/17 1134]   BP Pulse Resp Temp SpO2   (!) 170/80 79 20 98.4 °F (36.9 °C) (!) 94 %      MAP       110          Physical Exam  Nursing Notes and Vital Signs Reviewed.  Constitutional: Patient is in no acute distress. Well-developed and well-nourished.  Head: Atraumatic. Normocephalic.  Eyes: PERRL. EOM intact. Conjunctivae are not pale. No scleral icterus.  ENT: Mucous membranes are moist. Oropharynx is clear and symmetric.    Neck: Supple. Full ROM. No lymphadenopathy. No bruits.   Cardiovascular: Bradycardic. Regular rhythm.   Pulmonary/Chest: No respiratory distress. Clear to auscultation bilaterally. No wheezing or rales.  Abdominal: Soft and non-distended.  There is no tenderness.   Musculoskeletal: Moves all extremities. No obvious deformities. No edema. No calf tenderness.  LUE: has no evident deformity. Decreased ROM noted to LUE secondary to prior injury. No motor deficit. No distal sensory deficit.  Skin: Warm and dry.  Neurological:  Alert, awake, and appropriate.  Normal speech. Cranial nerves II-XII are intact. No acute focal neurological deficits are appreciated.FTN intact, GCS = 15.   Negative rhomberg.  NIHSS = 0. Psychiatric: Normal affect. Good eye contact. Appropriate in content.    ED Course    Procedures  ED Vital Signs:  Vitals:    12/26/17 1134 12/26/17 1208 12/26/17 1210  "12/26/17 1212   BP: (!) 170/80 (!) 168/81 (!) 189/85 (!) 158/75   Pulse: 79 71 75 81   Resp: 20      Temp: 98.4 °F (36.9 °C)      TempSrc: Oral      SpO2: (!) 94%      Weight: 72.6 kg (160 lb)      Height: 5' 4" (1.626 m)       12/26/17 1542 12/26/17 1543 12/26/17 1548   BP:   (!) 174/78   Pulse: 66  74   Resp: 20 18    Temp: 98.2 °F (36.8 °C)     TempSrc:      SpO2: 96%  96%   Weight:      Height:          Abnormal Lab Results:  Labs Reviewed   COMPREHENSIVE METABOLIC PANEL - Abnormal; Notable for the following:        Result Value    CO2 22 (*)     Creatinine 1.6 (*)     Albumin 3.4 (*)     Alkaline Phosphatase 39 (*)     eGFR if  34 (*)     eGFR if non  29 (*)     All other components within normal limits   CBC W/ AUTO DIFFERENTIAL   MAGNESIUM   URINALYSIS   TROPONIN I        All Lab Results:  Results for orders placed or performed during the hospital encounter of 12/26/17   CBC auto differential   Result Value Ref Range    WBC 5.34 3.90 - 12.70 K/uL    RBC 4.48 4.00 - 5.40 M/uL    Hemoglobin 13.3 12.0 - 16.0 g/dL    Hematocrit 39.7 37.0 - 48.5 %    MCV 89 82 - 98 fL    MCH 29.7 27.0 - 31.0 pg    MCHC 33.5 32.0 - 36.0 g/dL    RDW 13.8 11.5 - 14.5 %    Platelets 199 150 - 350 K/uL    MPV 9.4 9.2 - 12.9 fL    Gran # 3.6 1.8 - 7.7 K/uL    Lymph # 1.3 1.0 - 4.8 K/uL    Mono # 0.3 0.3 - 1.0 K/uL    Eos # 0.1 0.0 - 0.5 K/uL    Baso # 0.01 0.00 - 0.20 K/uL    Gran% 67.6 38.0 - 73.0 %    Lymph% 24.9 18.0 - 48.0 %    Mono% 6.2 4.0 - 15.0 %    Eosinophil% 1.1 0.0 - 8.0 %    Basophil% 0.2 0.0 - 1.9 %    Differential Method Automated    Comprehensive metabolic panel   Result Value Ref Range    Sodium 141 136 - 145 mmol/L    Potassium 3.8 3.5 - 5.1 mmol/L    Chloride 109 95 - 110 mmol/L    CO2 22 (L) 23 - 29 mmol/L    Glucose 106 70 - 110 mg/dL    BUN, Bld 14 8 - 23 mg/dL    Creatinine 1.6 (H) 0.5 - 1.4 mg/dL    Calcium 9.6 8.7 - 10.5 mg/dL    Total Protein 7.2 6.0 - 8.4 g/dL    Albumin 3.4 (L) " 3.5 - 5.2 g/dL    Total Bilirubin 0.9 0.1 - 1.0 mg/dL    Alkaline Phosphatase 39 (L) 55 - 135 U/L    AST 15 10 - 40 U/L    ALT 11 10 - 44 U/L    Anion Gap 10 8 - 16 mmol/L    eGFR if African American 34 (A) >60 mL/min/1.73 m^2    eGFR if non African American 29 (A) >60 mL/min/1.73 m^2   Magnesium   Result Value Ref Range    Magnesium 1.8 1.6 - 2.6 mg/dL   Urinalysis   Result Value Ref Range    Specimen UA Urine, Clean Catch     Color, UA Yellow Yellow, Straw, Jacki    Appearance, UA Clear Clear    pH, UA 6.0 5.0 - 8.0    Specific Gravity, UA 1.025 1.005 - 1.030    Protein, UA Negative Negative    Glucose, UA Negative Negative    Ketones, UA Negative Negative    Bilirubin (UA) Negative Negative    Occult Blood UA Negative Negative    Nitrite, UA Negative Negative    Urobilinogen, UA Negative <2.0 EU/dL    Leukocytes, UA Negative Negative   Troponin I   Result Value Ref Range    Troponin I <0.006 0.000 - 0.026 ng/mL         Imaging Results:  Imaging Results          CT Head Without Contrast (Final result)  Result time 12/26/17 14:13:12    Final result by GLORIA Benson Sr., MD (12/26/17 14:13:12)                 Impression:      1. There is no evidence of an acute ischemic event.  2. There is no intracranial hemorrhage.    All CT scans at this facility use dose modulation, iterative reconstruction, and/or weight base dosing when appropriate to reduce radiation dose when appropriate to reduce radiation dose to as low as reasonably achievable.      Electronically signed by: GLORIA BENSON MD  Date:     12/26/17  Time:    14:13              Narrative:    CT of Head without IV contrast    History:     Dizziness and giddiness    Technique: Standard brain CT protocol without IV contrast was performed.     Finding: Comparison was made to a prior examination performed on 6/3/2017. There are mild bilateral periventricular ischemic white matter changes. There is no evidence of an acute ischemic event. There is no intracranial  hemorrhage. There is no skull fracture. The paranasal sinuses are normal in appearance.                                  The EKG was ordered, reviewed, and independently interpreted by the ED provider.  Interpretation time: 12:56  Rate: 59 BPM  Rhythm: sinus bradycardia  Interpretation: Left axis deviation. Cannot rule out anterior infarct. No STEMI.      The Emergency Provider reviewed the vital signs and test results, which are outlined above.    ED Discussion     3:16 PM: Re-evaluated pt. Pt is resting comfortably and is in no acute distress. Pt was able to ambulate to the bathroom unassisted. D/w pt all pertinent results. D/w pt any concerns expressed at this time. Answered all questions. Pt expresses understanding at this time.    3:36 PM: Reassessed pt at this time. Pt is awake, alert, and in NAD. Pt states her condition has improved at this time. Discussed with pt all pertinent ED information and results. Discussed pt dx of renal insufficiency and plan of tx. Gave pt all f/u and return to the ED instructions. All questions and concerns were addressed at this time. Pt expresses understanding of information and instructions, and is comfortable with plan to discharge. Pt is stable for discharge.    I discussed with patient and/or family/caretaker that evaluation in the ED does not suggest any emergent or life threatening medical conditions requiring immediate intervention beyond what was provided in the ED, and I believe patient is safe for discharge.  Regardless, an unremarkable evaluation in the ED does not preclude the development or presence of a serious of life threatening condition. As such, patient was instructed to return immediately for any worsening or change in current symptoms.      ED Medication(s):  Medications   sodium chloride 0.9% bolus 500 mL (0 mLs Intravenous Stopped 12/26/17 1332)   meclizine tablet 25 mg (25 mg Oral Given 12/26/17 1546)       Discharge Medication List as of 12/26/2017  3:30  PM      START taking these medications    Details   meclizine (ANTIVERT) 25 mg tablet Take 1 tablet (25 mg total) by mouth 3 (three) times daily as needed for Dizziness., Starting Tue 12/26/2017, Print             Follow-up Information     Galdino Smith MD. Schedule an appointment as soon as possible for a visit in 2 days.    Specialty:  Family Medicine  Why:  Return to [facility] ED for:  numbness/weakness to one side, vomiting, slurred speech, vision loss  Contact information:  [address]  [facility] [id number] [phone]                     Medical Decision Making    Medical Decision Making:   Clinical Tests:   Lab Tests: Ordered and Reviewed  Radiological Study: Ordered and Reviewed           Scribe Attestation:   Scribe #1: I performed the above scribed service and the documentation accurately describes the services I performed. I attest to the accuracy of the note.    Attending:   Physician Attestation Statement for Scribe #1: I, Sommer Matson DO, personally performed the services described in this documentation, as scribed by Dereje Lan, in my presence, and it is both accurate and complete.          Clinical Impression       ICD-10-CM ICD-9-CM   1. Renal insufficiency N28.9 593.9   2. Dizziness R42 780.4   3. Essential hypertension I10 401.9       Disposition:   Disposition: Discharged  Condition: Stable         Sommer Matson DO  12/26/17 6226

## 2017-12-26 NOTE — ED NOTES
"Pt has complaints of feeling "dizzy" while standing. States she has 4 bouts of diarrhea in a 24 hours period. Denies any diarrhea today. Denies any chest pain/SOB/nausea/vomiting.     Level of Consciousness: The patient is awake, alert, and oriented with appropriate affect and speech; oriented to person, place and time.  Appearance: Sitting up in bed with no acute distress noted. Clothing and hygiene are clean and worn appropriately.  Skin: Skin is warm and dry with good skin turgor; intact; color consistent with ethnicity.  Mucous membranes are moist.   Musculoskeletal: Moves all extremities well in full range of motion. No obvious deformities or swelling noted.  Respiratory: Airway open and patent, respirations spontaneous, even and unlabored. No accessory muscles in use.   Cardiac: Regular rate and rhythm, good pulses palpated peripherally, capillary refill < 3 seconds.  Abdomen: Soft, non-tender to palpation. No distention noted.  Neurologic: PERRLA, face exhibits symmetrical expression, hand grasps equal and even bilaterally, normal sensation noted to all extremities and face when touched by a finger.    Patient verbalized understanding of status and plan of care. Side rails up x 2, call light in reach, bed low and locked. Family at bedside. Will continue to monitor.        "

## 2018-04-09 ENCOUNTER — HOSPITAL ENCOUNTER (EMERGENCY)
Facility: HOSPITAL | Age: 83
Discharge: HOME | End: 2018-04-09
Attending: EMERGENCY MEDICINE
Payer: COMMERCIAL

## 2018-04-09 VITALS
DIASTOLIC BLOOD PRESSURE: 80 MMHG | HEART RATE: 72 BPM | BODY MASS INDEX: 26.08 KG/M2 | OXYGEN SATURATION: 96 % | TEMPERATURE: 99 F | SYSTOLIC BLOOD PRESSURE: 155 MMHG | HEIGHT: 64 IN | WEIGHT: 152.75 LBS | RESPIRATION RATE: 19 BRPM

## 2018-04-09 DIAGNOSIS — I10 ESSENTIAL HYPERTENSION: ICD-10-CM

## 2018-04-09 DIAGNOSIS — R13.10 DIFFICULTY SWALLOWING PILLS: Primary | ICD-10-CM

## 2018-04-09 PROCEDURE — 99283 EMERGENCY DEPT VISIT LOW MDM: CPT

## 2018-04-09 NOTE — ED PROVIDER NOTES
"SCRIBE #1 NOTE: I, Danika Peterson, am scribing for, and in the presence of, Sommer Matson DO. I have scribed the entire note.      History      Chief Complaint   Patient presents with    Swallowed Foreign Body     B12 tablet is "stuck in throat"       Review of patient's allergies indicates:   Allergen Reactions    Meloxicam Other (See Comments)     States that mobic places her in more pain        HPI   HPI    4/9/2018, 4:40 PM   History obtained from the patient      History of Present Illness: Efe Melendez is a 84 y.o. female patient who presents to the Emergency Department stating that she has had a B12 tablet stuck in her throat since 11 AM this morning when taking her medications. Reports that she tried drinking water earlier today but she spit it up because it would not go down. She believes that the tablet has gown down since she has been in the ED but states that she hears a "rattling" in her throat. Pt has been able to handle her secretions. She states that she had problems with swallowing in the past but has never had an endoscopy. No modifying factors reported. Patient denies sore throat, voice change, CP, SOB, N/V, drooling, and all other sxs at this time. No further complaints or concerns at this time.     Arrival mode: Personal vehicle    PCP: Galdino Smith MD       Past Medical History:  Past Medical History:   Diagnosis Date    Diabetes mellitus     Hypertension     Thyroid disease        Past Surgical History:  Past Surgical History:   Procedure Laterality Date    BREAST SURGERY      CARPAL TUNNEL RELEASE      HYSTERECTOMY           Family History:  History reviewed. No pertinent family history.    Social History:  Social History     Social History Main Topics    Smoking status: Former Smoker    Smokeless tobacco: Unknown    Alcohol use No    Drug use: No    Sexual activity: Unknown       ROS   Review of Systems   Constitutional: Negative for chills and fever.   HENT: " Negative for drooling, sore throat and voice change.         (+) foreign body in throat   Respiratory: Negative for shortness of breath.    Cardiovascular: Negative for chest pain.   Gastrointestinal: Negative for nausea and vomiting.   Genitourinary: Negative for dysuria.   Musculoskeletal: Negative for back pain.   Skin: Negative for rash.   Neurological: Negative for weakness.   Hematological: Does not bruise/bleed easily.   All other systems reviewed and are negative.      Physical Exam      Initial Vitals [04/09/18 1535]   BP Pulse Resp Temp SpO2   (!) 164/79 74 19 98.8 °F (37.1 °C) 95 %      MAP       107.33          Physical Exam  Nursing Notes and Vital Signs Reviewed.  Constitutional: Patient is in no acute distress. Awake and alert. Well-developed and well-nourished.  Head: Atraumatic. Normocephalic.  Eyes: PERRL. EOM intact. Conjunctivae are not pale. No scleral icterus.  ENT: Mucous membranes are moist. Oropharynx is clear and symmetric. Airway is clear and patent. No drooling, she is handling secretions normally. Voice is normal. No stridor.  Neck: Supple. Full ROM. No lymphadenopathy. No masses in the neck.  Cardiovascular: Regular rate. Regular rhythm. No murmurs, rubs, or gallops. Distal pulses are 2+ and symmetric.  Pulmonary/Chest: No respiratory distress. Clear to auscultation bilaterally. No wheezing, rales, or rhonchi.  Abdominal: Soft and non-distended.  There is no tenderness.  No rebound, guarding, or rigidity.  Good bowel sounds.    Musculoskeletal: Moves all extremities. No obvious deformities.   Skin: Warm and dry.  Neurological:  Alert, awake, and appropriate.  Normal speech.  No acute focal neurological deficits are appreciated.  Psychiatric: Normal affect. Good eye contact. Appropriate in content.    ED Course    Procedures  ED Vital Signs:  Vitals:    04/09/18 1535   BP: (!) 164/79   Pulse: 74   Resp: 19   Temp: 98.8 °F (37.1 °C)   TempSrc: Oral   SpO2: 95%   Weight: 69.3 kg (152 lb  "12.5 oz)   Height: 5' 4" (1.626 m)     The Emergency Provider reviewed the vital signs and test results, which are outlined above.    ED Discussion     5:26 PM: Re-evaluated patient. Patient was able to swallow water and keep it down. Instructed patient to eat a soft diet. Discussed pt dx and plan of tx. Instructed patient to f/u with GI. All questions and concerns were addressed at this time. Pt expresses understanding of information and instructions, and is comfortable with plan to discharge. Pt is stable for discharge.    I discussed with patient and/or family/caretaker that evaluation in the ED does not suggest any emergent or life threatening medical conditions requiring immediate intervention beyond what was provided in the ED, and I believe patient is safe for discharge.  Regardless, an unremarkable evaluation in the ED does not preclude the development or presence of a serious of life threatening condition. As such, patient was instructed to return immediately for any worsening or change in current symptoms.    Pre-hypertension/Hypertension: The pt has been informed that they may have pre-hypertension or hypertension based on a blood pressure reading in the ED. I recommend that the pt call the PCP listed on their discharge instructions or a physician of their choice this week to arrange f/u for further evaluation of possible pre-hypertension or hypertension.     ED Medication(s):  Medications - No data to display    New Prescriptions    No medications on file       Follow-up Information     J Enrico Alexander Sr, MD In 2 days.    Specialty:  Gastroenterology  Why:  Discuss with your physician about outpatient EGC, consider arranging EGD with US if available if pancreatic mass needs re-evaluation.   Return to the ED for:  difficulty talking, swallowing, breathing, fever or other concerns.  Contact information:  [id number] [place] [id number] [phone]                    Medical Decision " Making              Scribe Attestation:   Scribe #1: I performed the above scribed service and the documentation accurately describes the services I performed. I attest to the accuracy of the note.    Attending:   Physician Attestation Statement for Scribe #1: I, Sommer Matson DO, personally performed the services described in this documentation, as scribed by Danika Peterson, in my presence, and it is both accurate and complete.          Clinical Impression       ICD-10-CM ICD-9-CM   1. Difficulty swallowing pills R19.8 787.99   2. Essential hypertension I10 401.9       Disposition:   Disposition: Discharged  Condition: Stable         Sommer Matson DO  04/10/18 0258

## 2021-03-21 ENCOUNTER — HOSPITAL ENCOUNTER (EMERGENCY)
Facility: HOSPITAL | Age: 86
Discharge: HOME | End: 2021-03-21
Attending: EMERGENCY MEDICINE
Payer: COMMERCIAL

## 2021-03-21 VITALS
HEART RATE: 73 BPM | HEIGHT: 64 IN | BODY MASS INDEX: 26.22 KG/M2 | DIASTOLIC BLOOD PRESSURE: 68 MMHG | TEMPERATURE: 99 F | SYSTOLIC BLOOD PRESSURE: 148 MMHG | RESPIRATION RATE: 18 BRPM | OXYGEN SATURATION: 93 %

## 2021-03-21 DIAGNOSIS — N18.9 CHRONIC RENAL IMPAIRMENT, UNSPECIFIED CKD STAGE: ICD-10-CM

## 2021-03-21 DIAGNOSIS — R00.0 TACHYCARDIA: ICD-10-CM

## 2021-03-21 DIAGNOSIS — R10.9 LEFT FLANK PAIN: Primary | ICD-10-CM

## 2021-03-21 LAB
ALBUMIN SERPL BCP-MCNC: 3.7 G/DL (ref 3.5–5.2)
ALP SERPL-CCNC: 46 U/L (ref 55–135)
ALT SERPL W/O P-5'-P-CCNC: 8 U/L (ref 10–44)
ANION GAP SERPL CALC-SCNC: 13 MMOL/L (ref 8–16)
AST SERPL-CCNC: 14 U/L (ref 10–40)
BACTERIA #/AREA URNS HPF: ABNORMAL /HPF
BASOPHILS # BLD AUTO: 0.04 K/UL (ref 0–0.2)
BASOPHILS NFR BLD: 0.7 % (ref 0–1.9)
BILIRUB SERPL-MCNC: 0.6 MG/DL (ref 0.1–1)
BILIRUB UR QL STRIP: NEGATIVE
BNP SERPL-MCNC: 55 PG/ML (ref 0–99)
BUN SERPL-MCNC: 30 MG/DL (ref 8–23)
CALCIUM SERPL-MCNC: 10.2 MG/DL (ref 8.7–10.5)
CHLORIDE SERPL-SCNC: 106 MMOL/L (ref 95–110)
CLARITY UR: CLEAR
CO2 SERPL-SCNC: 22 MMOL/L (ref 23–29)
COLOR UR: YELLOW
CREAT SERPL-MCNC: 2.2 MG/DL (ref 0.5–1.4)
D DIMER PPP IA.FEU-MCNC: 2.37 MG/L FEU
DIFFERENTIAL METHOD: ABNORMAL
EOSINOPHIL # BLD AUTO: 0.1 K/UL (ref 0–0.5)
EOSINOPHIL NFR BLD: 2.5 % (ref 0–8)
ERYTHROCYTE [DISTWIDTH] IN BLOOD BY AUTOMATED COUNT: 14 % (ref 11.5–14.5)
EST. GFR  (AFRICAN AMERICAN): 23 ML/MIN/1.73 M^2
EST. GFR  (NON AFRICAN AMERICAN): 20 ML/MIN/1.73 M^2
GLUCOSE SERPL-MCNC: 102 MG/DL (ref 70–110)
GLUCOSE UR QL STRIP: NEGATIVE
HCT VFR BLD AUTO: 38.7 % (ref 37–48.5)
HGB BLD-MCNC: 11.9 G/DL (ref 12–16)
HGB UR QL STRIP: NEGATIVE
IMM GRANULOCYTES # BLD AUTO: 0.02 K/UL (ref 0–0.04)
IMM GRANULOCYTES NFR BLD AUTO: 0.4 % (ref 0–0.5)
KETONES UR QL STRIP: NEGATIVE
LEUKOCYTE ESTERASE UR QL STRIP: ABNORMAL
LIPASE SERPL-CCNC: 37 U/L (ref 4–60)
LYMPHOCYTES # BLD AUTO: 1.5 K/UL (ref 1–4.8)
LYMPHOCYTES NFR BLD: 25.8 % (ref 18–48)
MCH RBC QN AUTO: 28.2 PG (ref 27–31)
MCHC RBC AUTO-ENTMCNC: 30.7 G/DL (ref 32–36)
MCV RBC AUTO: 92 FL (ref 82–98)
MICROSCOPIC COMMENT: ABNORMAL
MONOCYTES # BLD AUTO: 0.4 K/UL (ref 0.3–1)
MONOCYTES NFR BLD: 7.6 % (ref 4–15)
NEUTROPHILS # BLD AUTO: 3.6 K/UL (ref 1.8–7.7)
NEUTROPHILS NFR BLD: 63 % (ref 38–73)
NITRITE UR QL STRIP: POSITIVE
NRBC BLD-RTO: 0 /100 WBC
PH UR STRIP: 7 [PH] (ref 5–8)
PLATELET # BLD AUTO: 307 K/UL (ref 150–350)
PMV BLD AUTO: 9.2 FL (ref 9.2–12.9)
POTASSIUM SERPL-SCNC: 4.4 MMOL/L (ref 3.5–5.1)
PROT SERPL-MCNC: 8.1 G/DL (ref 6–8.4)
PROT UR QL STRIP: ABNORMAL
RBC # BLD AUTO: 4.22 M/UL (ref 4–5.4)
SODIUM SERPL-SCNC: 141 MMOL/L (ref 136–145)
SP GR UR STRIP: 1.01 (ref 1–1.03)
TROPONIN I SERPL DL<=0.01 NG/ML-MCNC: 0.01 NG/ML (ref 0–0.03)
URN SPEC COLLECT METH UR: ABNORMAL
UROBILINOGEN UR STRIP-ACNC: 1 EU/DL
WBC # BLD AUTO: 5.66 K/UL (ref 3.9–12.7)
WBC #/AREA URNS HPF: 8 /HPF (ref 0–5)

## 2021-03-21 PROCEDURE — 84484 ASSAY OF TROPONIN QUANT: CPT | Performed by: EMERGENCY MEDICINE

## 2021-03-21 PROCEDURE — 80053 COMPREHEN METABOLIC PANEL: CPT | Performed by: EMERGENCY MEDICINE

## 2021-03-21 PROCEDURE — 85025 COMPLETE CBC W/AUTO DIFF WBC: CPT | Performed by: EMERGENCY MEDICINE

## 2021-03-21 PROCEDURE — 83690 ASSAY OF LIPASE: CPT | Performed by: EMERGENCY MEDICINE

## 2021-03-21 PROCEDURE — 85379 FIBRIN DEGRADATION QUANT: CPT | Performed by: EMERGENCY MEDICINE

## 2021-03-21 PROCEDURE — 36415 COLL VENOUS BLD VENIPUNCTURE: CPT | Performed by: EMERGENCY MEDICINE

## 2021-03-21 PROCEDURE — 83880 ASSAY OF NATRIURETIC PEPTIDE: CPT | Performed by: EMERGENCY MEDICINE

## 2021-03-21 PROCEDURE — 81000 URINALYSIS NONAUTO W/SCOPE: CPT | Performed by: EMERGENCY MEDICINE

## 2021-03-21 PROCEDURE — 99285 EMERGENCY DEPT VISIT HI MDM: CPT | Mod: 25

## 2021-04-02 ENCOUNTER — IMMUNIZATION (OUTPATIENT)
Dept: INTERNAL MEDICINE | Facility: CLINIC | Age: 86
End: 2021-04-02
Payer: COMMERCIAL

## 2021-04-02 DIAGNOSIS — Z23 NEED FOR VACCINATION: Primary | ICD-10-CM

## 2021-04-02 PROCEDURE — 91300 COVID-19, MRNA, LNP-S, PF, 30 MCG/0.3 ML DOSE VACCINE: CPT | Mod: S$GLB,,, | Performed by: FAMILY MEDICINE

## 2021-04-02 PROCEDURE — 0001A COVID-19, MRNA, LNP-S, PF, 30 MCG/0.3 ML DOSE VACCINE: ICD-10-PCS | Mod: CV19,S$GLB,, | Performed by: FAMILY MEDICINE

## 2021-04-02 PROCEDURE — 0001A COVID-19, MRNA, LNP-S, PF, 30 MCG/0.3 ML DOSE VACCINE: CPT | Mod: CV19,S$GLB,, | Performed by: FAMILY MEDICINE

## 2021-04-02 PROCEDURE — 91300 COVID-19, MRNA, LNP-S, PF, 30 MCG/0.3 ML DOSE VACCINE: ICD-10-PCS | Mod: S$GLB,,, | Performed by: FAMILY MEDICINE

## 2021-04-23 ENCOUNTER — IMMUNIZATION (OUTPATIENT)
Dept: INTERNAL MEDICINE | Facility: CLINIC | Age: 86
End: 2021-04-23
Payer: COMMERCIAL

## 2021-04-23 DIAGNOSIS — Z23 NEED FOR VACCINATION: Primary | ICD-10-CM

## 2021-04-23 PROCEDURE — 0002A COVID-19, MRNA, LNP-S, PF, 30 MCG/0.3 ML DOSE VACCINE: CPT | Mod: PBBFAC | Performed by: FAMILY MEDICINE

## 2021-04-23 PROCEDURE — 91300 COVID-19, MRNA, LNP-S, PF, 30 MCG/0.3 ML DOSE VACCINE: CPT | Mod: PBBFAC | Performed by: FAMILY MEDICINE
